# Patient Record
Sex: MALE | URBAN - METROPOLITAN AREA
[De-identification: names, ages, dates, MRNs, and addresses within clinical notes are randomized per-mention and may not be internally consistent; named-entity substitution may affect disease eponyms.]

---

## 2020-09-21 ENCOUNTER — APPOINTMENT (RX ONLY)
Dept: URBAN - METROPOLITAN AREA CLINIC 52 | Facility: CLINIC | Age: 50
Setting detail: DERMATOLOGY
End: 2020-09-21

## 2020-09-21 DIAGNOSIS — L57.0 ACTINIC KERATOSIS: ICD-10-CM

## 2020-09-21 DIAGNOSIS — D485 NEOPLASM OF UNCERTAIN BEHAVIOR OF SKIN: ICD-10-CM

## 2020-09-21 PROBLEM — D48.5 NEOPLASM OF UNCERTAIN BEHAVIOR OF SKIN: Status: ACTIVE | Noted: 2020-09-21

## 2020-09-21 PROCEDURE — ? COUNSELING

## 2020-09-21 PROCEDURE — 99203 OFFICE O/P NEW LOW 30 MIN: CPT

## 2020-09-21 PROCEDURE — ? DEFER

## 2020-09-21 PROCEDURE — ? OTHER

## 2020-09-21 ASSESSMENT — TOTAL NUMBER OF LESIONS: # OF LESIONS?: 30

## 2020-09-21 ASSESSMENT — PAIN INTENSITY VAS: HOW INTENSE IS YOUR PAIN 0 BEING NO PAIN, 10 BEING THE MOST SEVERE PAIN POSSIBLE?: NO PAIN

## 2020-09-21 NOTE — PROCEDURE: DEFER
Introduction Text (Please End With A Colon): The following procedure was deferred:\\[id number] x 1\\[id number] x 1
Detail Level: Detailed

## 2020-09-21 NOTE — PROCEDURE: OTHER
Note Text (......Xxx Chief Complaint.): This diagnosis correlates with the
Detail Level: Zone
Other (Free Text): Defer and requesting codes \\[id number] \\nFace and arms
Other (Free Text): Defer and requesting codes \\W42816A5; 11103x3\\nA. Right inferior scapula \\n     1.3 cm\\n     SCC vs. BCC\\nB. Left dorsal hand \\n     1.3 cm \\n     SCC vs. BCC\\nC. Left dorsal forearm \\n     1.3 cm \\n     SCC vs. BCC\\nD.  Right dorsal forearm \\n     1.3 cm\\n     SCC vs. BCC